# Patient Record
(demographics unavailable — no encounter records)

---

## 2025-07-21 NOTE — DATA REVIEWED
[FreeTextEntry1] : Patient Name ERIC MENA Date of Birth 1974-12-23 Procedure MR ABDOMEN with and w/o contrast Study Date & Time 2025-07-07 11:55 AM Patient ID [id number] Accession Number [id number] Referring Physician NAVIN MEJIA Institution Name [id number] Report RADIOLOGY REPORT FINDINGS FINDING MR ABDOMEN with and w/o contrast CLINICAL INDICATION: Soft tissue mass, further evaluation. TECHNIQUE: Multiplanar and multisequence MR images of the ABDOMINAL WALL were obtained on a 3 Candace magnet before and after the administration of 7.5 cc Elucirem intravenous contrast according to standard protocol. COMPARISON: Abdominal sonogram 6/17/2025. FINDINGS: Vitamin E markers overlie the lower anterior abdominal wall. Underlying the region of clinical concern, there is a small fat-containing umbilical hernia (series 6, image 13). More inferiorly at this location, there is confluent soft tissue induration with delayed enhancement compatible with scar formation. Within this region, there is a T2 hyperintense enhancing tract without contact with the skin surface (series 13, image 29). Muscle signal is maintained without evidence of fatty atrophy or edema. Visualized bowel loops are normal in caliber. Visualized neurovascular structures are normal-appearing. No evidence of acute fracture or abnormal marrow signal within the field of view. IMPRESSION: Small fat-containing umbilical hernia. Lower anterior abdominal wall incisional scar with confluent scar formation. T2 hyperintense, enhancing tract likely represents a resolving postsurgical collection/seroma with granulation tissue. Continued clinical follow-up is advised. If there is suspicious growth/evolution, repeat imaging may be performed. 7/21/25, 2:38 PM [employer] [email or web address] Electronically signed by: Enrique Michaels MD 7/9/2025 7:05 AM Workstation: [id number] Electronically Signed By: Luciano Michaels MD, Enrique Sign Date: 09-JUL-25 [facility]
no syncope/no change in level of consciousness/no numbness/no loss of consciousness/no blurred vision/no fever

## 2025-07-21 NOTE — PLAN
[FreeTextEntry1] : Ms. MENA  was told significance of findings, options, risks and benefits were explained.  Informed consent for excision mass lower abdominal wall , and potential risks, benefits and alternatives (surgical options were discussed including non-surgical options or the option of no surgery) to the planned surgery were discussed in depth.  All surgical options were discussed including non-surgical treatments.  She wishes to proceed with surgery.  We will plan for surgery on at the next available date, pending any required insurance pre-certification or pre-approval. She agrees to obtain any necessary pre-operative evaluations and testing prior to surgery. Patient advised to seek immediate medical attention with any acute change in symptoms or with the development of any new or worsening symptoms.  Patient's questions and concerns addressed to patient's satisfaction, and patient verbalized an understanding of the information discussed.

## 2025-07-21 NOTE — PHYSICAL EXAM
[Alert] : alert [Oriented to Person] : oriented to person [Oriented to Place] : oriented to place [Oriented to Time] : oriented to time [Calm] : calm [de-identified] :  abdominoplasty scar with keloid formation. lower abdominal wall mass is fixed , Firm,  Smooth, non-tender,   Well defined. deep. No palpable lymph nodes.      size 3 cm x3 cm

## 2025-07-21 NOTE — HISTORY OF PRESENT ILLNESS
[de-identified] : This is a 50 year  old patient who was referred by Dr. Emmie Montoya with the chief complaint of having abdominal wall mass.  She reports having this condition for 1 year. She denies any trauma to the area.   She denies any fever or  night sweats. Appetite is good and weight is stable.  She states that recently the mass started to  get  bigger and  more symptomatic  . She wants to know if it could  be surgically  removed. she reports abdominoplasty 2 years ago and hysterectomy 1 year ago.  Ms. MENA  is s/p MRI abdomen on 07/07/2025 that showed Lower anterior abdominal wall incisional scar with confluent scar formation.  enhancing tract likely represents a resolving postsurgical collection/seroma with granulation tissue.

## 2025-07-21 NOTE — CONSULT LETTER
[Dear  ___] : Dear  [unfilled], [Consult Letter:] : I had the pleasure of evaluating your patient, [unfilled]. [Please see my note below.] : Please see my note below. [Consult Closing:] : Thank you very much for allowing me to participate in the care of this patient.  If you have any questions, please do not hesitate to contact me. [Sincerely,] : Sincerely, [FreeTextEntry3] : Boris Smith MD, FACS